# Patient Record
Sex: MALE | Race: WHITE | NOT HISPANIC OR LATINO | URBAN - METROPOLITAN AREA
[De-identification: names, ages, dates, MRNs, and addresses within clinical notes are randomized per-mention and may not be internally consistent; named-entity substitution may affect disease eponyms.]

---

## 2017-03-20 ENCOUNTER — EMERGENCY (EMERGENCY)
Facility: HOSPITAL | Age: 52
LOS: 1 days | Discharge: HOME | End: 2017-03-20
Attending: EMERGENCY MEDICINE
Payer: SELF-PAY

## 2017-03-20 VITALS
RESPIRATION RATE: 16 BRPM | TEMPERATURE: 98 F | DIASTOLIC BLOOD PRESSURE: 71 MMHG | OXYGEN SATURATION: 100 % | SYSTOLIC BLOOD PRESSURE: 127 MMHG | HEART RATE: 87 BPM

## 2017-03-20 VITALS — WEIGHT: 198.42 LBS | HEIGHT: 66 IN

## 2017-03-20 DIAGNOSIS — R20.2 PARESTHESIA OF SKIN: ICD-10-CM

## 2017-03-20 DIAGNOSIS — R11.0 NAUSEA: ICD-10-CM

## 2017-03-20 DIAGNOSIS — R07.89 OTHER CHEST PAIN: ICD-10-CM

## 2017-03-20 DIAGNOSIS — I10 ESSENTIAL (PRIMARY) HYPERTENSION: ICD-10-CM

## 2017-03-20 LAB
ALBUMIN SERPL ELPH-MCNC: 3.8 G/DL — SIGNIFICANT CHANGE UP (ref 3.5–5)
ALP SERPL-CCNC: 80 U/L — SIGNIFICANT CHANGE UP (ref 40–120)
ALT FLD-CCNC: 30 U/L DA — SIGNIFICANT CHANGE UP (ref 10–60)
ANION GAP SERPL CALC-SCNC: 8 MMOL/L — SIGNIFICANT CHANGE UP (ref 5–17)
AST SERPL-CCNC: 25 U/L — SIGNIFICANT CHANGE UP (ref 10–40)
BASOPHILS # BLD AUTO: 0.1 K/UL — SIGNIFICANT CHANGE UP (ref 0–0.2)
BASOPHILS NFR BLD AUTO: 1.6 % — SIGNIFICANT CHANGE UP (ref 0–2)
BILIRUB SERPL-MCNC: 0.6 MG/DL — SIGNIFICANT CHANGE UP (ref 0.2–1.2)
BUN SERPL-MCNC: 17 MG/DL — SIGNIFICANT CHANGE UP (ref 7–18)
CALCIUM SERPL-MCNC: 8.6 MG/DL — SIGNIFICANT CHANGE UP (ref 8.4–10.5)
CHLORIDE SERPL-SCNC: 107 MMOL/L — SIGNIFICANT CHANGE UP (ref 96–108)
CK MB BLD-MCNC: 1.2 % — SIGNIFICANT CHANGE UP (ref 0–3.5)
CK MB CFR SERPL CALC: 2.6 NG/ML — SIGNIFICANT CHANGE UP (ref 0–3.6)
CK SERPL-CCNC: 208 U/L — SIGNIFICANT CHANGE UP (ref 35–232)
CO2 SERPL-SCNC: 25 MMOL/L — SIGNIFICANT CHANGE UP (ref 22–31)
CREAT SERPL-MCNC: 0.92 MG/DL — SIGNIFICANT CHANGE UP (ref 0.5–1.3)
D DIMER BLD IA.RAPID-MCNC: <150 NG/ML DDU — SIGNIFICANT CHANGE UP
EOSINOPHIL # BLD AUTO: 0 K/UL — SIGNIFICANT CHANGE UP (ref 0–0.5)
EOSINOPHIL NFR BLD AUTO: 0.5 % — SIGNIFICANT CHANGE UP (ref 0–6)
GLUCOSE SERPL-MCNC: 109 MG/DL — HIGH (ref 70–99)
HCT VFR BLD CALC: 39.7 % — SIGNIFICANT CHANGE UP (ref 39–50)
HGB BLD-MCNC: 13.4 G/DL — SIGNIFICANT CHANGE UP (ref 13–17)
LIDOCAIN IGE QN: 109 U/L — SIGNIFICANT CHANGE UP (ref 73–393)
LYMPHOCYTES # BLD AUTO: 1.3 K/UL — SIGNIFICANT CHANGE UP (ref 1–3.3)
LYMPHOCYTES # BLD AUTO: 23.4 % — SIGNIFICANT CHANGE UP (ref 13–44)
MCHC RBC-ENTMCNC: 31.6 PG — SIGNIFICANT CHANGE UP (ref 27–34)
MCHC RBC-ENTMCNC: 33.8 GM/DL — SIGNIFICANT CHANGE UP (ref 32–36)
MCV RBC AUTO: 93.7 FL — SIGNIFICANT CHANGE UP (ref 80–100)
MONOCYTES # BLD AUTO: 0.6 K/UL — SIGNIFICANT CHANGE UP (ref 0–0.9)
MONOCYTES NFR BLD AUTO: 9.9 % — SIGNIFICANT CHANGE UP (ref 2–14)
NEUTROPHILS # BLD AUTO: 3.7 K/UL — SIGNIFICANT CHANGE UP (ref 1.8–7.4)
NEUTROPHILS NFR BLD AUTO: 64.5 % — SIGNIFICANT CHANGE UP (ref 43–77)
NT-PROBNP SERPL-SCNC: 48 PG/ML — SIGNIFICANT CHANGE UP (ref 0–125)
PLATELET # BLD AUTO: 220 K/UL — SIGNIFICANT CHANGE UP (ref 150–400)
POTASSIUM SERPL-MCNC: 3.7 MMOL/L — SIGNIFICANT CHANGE UP (ref 3.5–5.3)
POTASSIUM SERPL-SCNC: 3.7 MMOL/L — SIGNIFICANT CHANGE UP (ref 3.5–5.3)
PROT SERPL-MCNC: 7.9 G/DL — SIGNIFICANT CHANGE UP (ref 6–8.3)
RBC # BLD: 4.23 M/UL — SIGNIFICANT CHANGE UP (ref 4.2–5.8)
RBC # FLD: 11.8 % — SIGNIFICANT CHANGE UP (ref 10.3–14.5)
SODIUM SERPL-SCNC: 140 MMOL/L — SIGNIFICANT CHANGE UP (ref 135–145)
TROPONIN I SERPL-MCNC: <0.015 NG/ML — SIGNIFICANT CHANGE UP (ref 0–0.04)
WBC # BLD: 5.7 K/UL — SIGNIFICANT CHANGE UP (ref 3.8–10.5)
WBC # FLD AUTO: 5.7 K/UL — SIGNIFICANT CHANGE UP (ref 3.8–10.5)

## 2017-03-20 PROCEDURE — 71020: CPT | Mod: 26

## 2017-03-20 PROCEDURE — 93005 ELECTROCARDIOGRAM TRACING: CPT

## 2017-03-20 PROCEDURE — 36000 PLACE NEEDLE IN VEIN: CPT

## 2017-03-20 PROCEDURE — 99285 EMERGENCY DEPT VISIT HI MDM: CPT

## 2017-03-20 PROCEDURE — 82553 CREATINE MB FRACTION: CPT

## 2017-03-20 PROCEDURE — 83880 ASSAY OF NATRIURETIC PEPTIDE: CPT

## 2017-03-20 PROCEDURE — 84484 ASSAY OF TROPONIN QUANT: CPT

## 2017-03-20 PROCEDURE — 85027 COMPLETE CBC AUTOMATED: CPT

## 2017-03-20 PROCEDURE — 82550 ASSAY OF CK (CPK): CPT

## 2017-03-20 PROCEDURE — 80053 COMPREHEN METABOLIC PANEL: CPT

## 2017-03-20 PROCEDURE — 85379 FIBRIN DEGRADATION QUANT: CPT

## 2017-03-20 PROCEDURE — 99283 EMERGENCY DEPT VISIT LOW MDM: CPT

## 2017-03-20 PROCEDURE — 83690 ASSAY OF LIPASE: CPT

## 2017-03-20 PROCEDURE — 71046 X-RAY EXAM CHEST 2 VIEWS: CPT

## 2017-03-20 RX ORDER — SODIUM CHLORIDE 9 MG/ML
3 INJECTION INTRAMUSCULAR; INTRAVENOUS; SUBCUTANEOUS ONCE
Qty: 0 | Refills: 0 | Status: COMPLETED | OUTPATIENT
Start: 2017-03-20 | End: 2017-03-20

## 2017-03-20 RX ADMIN — SODIUM CHLORIDE 3 MILLILITER(S): 9 INJECTION INTRAMUSCULAR; INTRAVENOUS; SUBCUTANEOUS at 18:14

## 2017-03-20 NOTE — ED PROVIDER NOTE - NS ED MD SCRIBE ATTENDING SCRIBE SECTIONS
PHYSICAL EXAM/HIV/HISTORY OF PRESENT ILLNESS/PAST MEDICAL/SURGICAL/SOCIAL HISTORY/VITAL SIGNS( Pullset)/DISPOSITION/REVIEW OF SYSTEMS

## 2017-03-20 NOTE — ED ADULT NURSE NOTE - OBJECTIVE STATEMENT
pt a&ox3, here with c/o CP. pt states mild non-radiating CP x5 days. denies n/v, cough, sweats, dizziness, SOB, back pain.

## 2017-03-20 NOTE — ED PROVIDER NOTE - MEDICAL DECISION MAKING DETAILS
50 y/o M pt presents with CP an nausea. Will send card panel, likely 1 set and reassess. 50 y/o M pt presents with CP an nausea. Will send card panel, likely 1 set and reassess. HEART score 1-2

## 2017-03-20 NOTE — ED ADULT NURSE NOTE - CHPI ED SYMPTOMS NEG
no nausea/no shortness of breath/no back pain/no cough/no fever/no vomiting/no chills/no dizziness/no syncope/no diaphoresis

## 2017-03-20 NOTE — ED PROVIDER NOTE - OBJECTIVE STATEMENT
50 y/o M pt with PMHx of HTN presents to the ED c/o chest pressure x 2 days. Pt also notes nausea and tingling to L 4th + 5th fingers. Pt denies fever, cough, SOB, ear pain, throat pain or any other complaints at this time. NKDA

## 2018-06-23 ENCOUNTER — INPATIENT (INPATIENT)
Facility: HOSPITAL | Age: 53
LOS: 2 days | Discharge: HOME | DRG: 370 | End: 2018-06-26
Attending: STUDENT IN AN ORGANIZED HEALTH CARE EDUCATION/TRAINING PROGRAM | Admitting: STUDENT IN AN ORGANIZED HEALTH CARE EDUCATION/TRAINING PROGRAM
Payer: MEDICAID

## 2018-06-23 VITALS
HEART RATE: 103 BPM | SYSTOLIC BLOOD PRESSURE: 156 MMHG | OXYGEN SATURATION: 98 % | TEMPERATURE: 98 F | RESPIRATION RATE: 18 BRPM | DIASTOLIC BLOOD PRESSURE: 103 MMHG

## 2018-06-23 DIAGNOSIS — I10 ESSENTIAL (PRIMARY) HYPERTENSION: ICD-10-CM

## 2018-06-23 DIAGNOSIS — K92.2 GASTROINTESTINAL HEMORRHAGE, UNSPECIFIED: ICD-10-CM

## 2018-06-23 DIAGNOSIS — Z29.9 ENCOUNTER FOR PROPHYLACTIC MEASURES, UNSPECIFIED: ICD-10-CM

## 2018-06-23 LAB
ALBUMIN SERPL ELPH-MCNC: 3.8 G/DL — SIGNIFICANT CHANGE UP (ref 3.5–5)
ALP SERPL-CCNC: 59 U/L — SIGNIFICANT CHANGE UP (ref 40–120)
ALT FLD-CCNC: 31 U/L DA — SIGNIFICANT CHANGE UP (ref 10–60)
ANION GAP SERPL CALC-SCNC: 9 MMOL/L — SIGNIFICANT CHANGE UP (ref 5–17)
APPEARANCE UR: CLEAR — SIGNIFICANT CHANGE UP
APTT BLD: 25.2 SEC — LOW (ref 27.5–37.4)
AST SERPL-CCNC: 20 U/L — SIGNIFICANT CHANGE UP (ref 10–40)
BASOPHILS # BLD AUTO: 0.1 K/UL — SIGNIFICANT CHANGE UP (ref 0–0.2)
BASOPHILS NFR BLD AUTO: 0.6 % — SIGNIFICANT CHANGE UP (ref 0–2)
BILIRUB SERPL-MCNC: 0.8 MG/DL — SIGNIFICANT CHANGE UP (ref 0.2–1.2)
BILIRUB UR-MCNC: NEGATIVE — SIGNIFICANT CHANGE UP
BUN SERPL-MCNC: 35 MG/DL — HIGH (ref 7–18)
CALCIUM SERPL-MCNC: 8.8 MG/DL — SIGNIFICANT CHANGE UP (ref 8.4–10.5)
CHLORIDE SERPL-SCNC: 108 MMOL/L — SIGNIFICANT CHANGE UP (ref 96–108)
CO2 SERPL-SCNC: 24 MMOL/L — SIGNIFICANT CHANGE UP (ref 22–31)
COLOR SPEC: YELLOW — SIGNIFICANT CHANGE UP
CREAT SERPL-MCNC: 0.87 MG/DL — SIGNIFICANT CHANGE UP (ref 0.5–1.3)
DIFF PNL FLD: NEGATIVE — SIGNIFICANT CHANGE UP
EOSINOPHIL # BLD AUTO: 0 K/UL — SIGNIFICANT CHANGE UP (ref 0–0.5)
EOSINOPHIL NFR BLD AUTO: 0 % — SIGNIFICANT CHANGE UP (ref 0–6)
GLUCOSE SERPL-MCNC: 115 MG/DL — HIGH (ref 70–99)
GLUCOSE UR QL: NEGATIVE — SIGNIFICANT CHANGE UP
HCT VFR BLD CALC: 41.2 % — SIGNIFICANT CHANGE UP (ref 39–50)
HGB BLD-MCNC: 13.6 G/DL — SIGNIFICANT CHANGE UP (ref 13–17)
INR BLD: 1 RATIO — SIGNIFICANT CHANGE UP (ref 0.88–1.16)
KETONES UR-MCNC: ABNORMAL
LEUKOCYTE ESTERASE UR-ACNC: NEGATIVE — SIGNIFICANT CHANGE UP
LIDOCAIN IGE QN: 67 U/L — LOW (ref 73–393)
LYMPHOCYTES # BLD AUTO: 1.4 K/UL — SIGNIFICANT CHANGE UP (ref 1–3.3)
LYMPHOCYTES # BLD AUTO: 9.6 % — LOW (ref 13–44)
MCHC RBC-ENTMCNC: 31.2 PG — SIGNIFICANT CHANGE UP (ref 27–34)
MCHC RBC-ENTMCNC: 33 GM/DL — SIGNIFICANT CHANGE UP (ref 32–36)
MCV RBC AUTO: 94.5 FL — SIGNIFICANT CHANGE UP (ref 80–100)
MONOCYTES # BLD AUTO: 0.7 K/UL — SIGNIFICANT CHANGE UP (ref 0–0.9)
MONOCYTES NFR BLD AUTO: 5.1 % — SIGNIFICANT CHANGE UP (ref 2–14)
NEUTROPHILS # BLD AUTO: 12.4 K/UL — HIGH (ref 1.8–7.4)
NEUTROPHILS NFR BLD AUTO: 84.7 % — HIGH (ref 43–77)
NITRITE UR-MCNC: NEGATIVE — SIGNIFICANT CHANGE UP
OB PNL STL: POSITIVE
PH UR: 6 — SIGNIFICANT CHANGE UP (ref 5–8)
PLATELET # BLD AUTO: 236 K/UL — SIGNIFICANT CHANGE UP (ref 150–400)
POTASSIUM SERPL-MCNC: 4.9 MMOL/L — SIGNIFICANT CHANGE UP (ref 3.5–5.3)
POTASSIUM SERPL-SCNC: 4.9 MMOL/L — SIGNIFICANT CHANGE UP (ref 3.5–5.3)
PROT SERPL-MCNC: 8.1 G/DL — SIGNIFICANT CHANGE UP (ref 6–8.3)
PROT UR-MCNC: NEGATIVE — SIGNIFICANT CHANGE UP
PROTHROM AB SERPL-ACNC: 10.9 SEC — SIGNIFICANT CHANGE UP (ref 9.8–12.7)
RBC # BLD: 4.36 M/UL — SIGNIFICANT CHANGE UP (ref 4.2–5.8)
RBC # FLD: 12.9 % — SIGNIFICANT CHANGE UP (ref 10.3–14.5)
SODIUM SERPL-SCNC: 141 MMOL/L — SIGNIFICANT CHANGE UP (ref 135–145)
SP GR SPEC: 1.01 — SIGNIFICANT CHANGE UP (ref 1.01–1.02)
UROBILINOGEN FLD QL: NEGATIVE — SIGNIFICANT CHANGE UP
WBC # BLD: 14.7 K/UL — HIGH (ref 3.8–10.5)
WBC # FLD AUTO: 14.7 K/UL — HIGH (ref 3.8–10.5)

## 2018-06-23 PROCEDURE — 93010 ELECTROCARDIOGRAM REPORT: CPT | Mod: 76

## 2018-06-23 PROCEDURE — 99285 EMERGENCY DEPT VISIT HI MDM: CPT

## 2018-06-23 RX ORDER — PANTOPRAZOLE SODIUM 20 MG/1
8 TABLET, DELAYED RELEASE ORAL
Qty: 80 | Refills: 0 | Status: DISCONTINUED | OUTPATIENT
Start: 2018-06-23 | End: 2018-06-26

## 2018-06-23 RX ORDER — SODIUM CHLORIDE 9 MG/ML
1000 INJECTION INTRAMUSCULAR; INTRAVENOUS; SUBCUTANEOUS ONCE
Qty: 0 | Refills: 0 | Status: COMPLETED | OUTPATIENT
Start: 2018-06-23 | End: 2018-06-23

## 2018-06-23 RX ORDER — PANTOPRAZOLE SODIUM 20 MG/1
40 TABLET, DELAYED RELEASE ORAL ONCE
Qty: 0 | Refills: 0 | Status: COMPLETED | OUTPATIENT
Start: 2018-06-23 | End: 2018-06-23

## 2018-06-23 RX ORDER — SODIUM CHLORIDE 9 MG/ML
1000 INJECTION, SOLUTION INTRAVENOUS
Qty: 0 | Refills: 0 | Status: DISCONTINUED | OUTPATIENT
Start: 2018-06-23 | End: 2018-06-24

## 2018-06-23 RX ORDER — VALSARTAN 80 MG/1
160 TABLET ORAL DAILY
Qty: 0 | Refills: 0 | Status: DISCONTINUED | OUTPATIENT
Start: 2018-06-23 | End: 2018-06-24

## 2018-06-23 RX ORDER — FOLIC ACID 0.8 MG
1 TABLET ORAL DAILY
Qty: 0 | Refills: 0 | Status: DISCONTINUED | OUTPATIENT
Start: 2018-06-23 | End: 2018-06-26

## 2018-06-23 RX ORDER — SODIUM CHLORIDE 9 MG/ML
3 INJECTION INTRAMUSCULAR; INTRAVENOUS; SUBCUTANEOUS ONCE
Qty: 0 | Refills: 0 | Status: COMPLETED | OUTPATIENT
Start: 2018-06-23 | End: 2018-06-23

## 2018-06-23 RX ORDER — THIAMINE MONONITRATE (VIT B1) 100 MG
100 TABLET ORAL DAILY
Qty: 0 | Refills: 0 | Status: DISCONTINUED | OUTPATIENT
Start: 2018-06-23 | End: 2018-06-26

## 2018-06-23 RX ORDER — ONDANSETRON 8 MG/1
4 TABLET, FILM COATED ORAL ONCE
Qty: 0 | Refills: 0 | Status: COMPLETED | OUTPATIENT
Start: 2018-06-23 | End: 2018-06-23

## 2018-06-23 RX ADMIN — PANTOPRAZOLE SODIUM 10 MG/HR: 20 TABLET, DELAYED RELEASE ORAL at 16:57

## 2018-06-23 RX ADMIN — PANTOPRAZOLE SODIUM 40 MILLIGRAM(S): 20 TABLET, DELAYED RELEASE ORAL at 16:44

## 2018-06-23 RX ADMIN — SODIUM CHLORIDE 3 MILLILITER(S): 9 INJECTION INTRAMUSCULAR; INTRAVENOUS; SUBCUTANEOUS at 16:06

## 2018-06-23 RX ADMIN — ONDANSETRON 4 MILLIGRAM(S): 8 TABLET, FILM COATED ORAL at 16:01

## 2018-06-23 RX ADMIN — SODIUM CHLORIDE 2000 MILLILITER(S): 9 INJECTION INTRAMUSCULAR; INTRAVENOUS; SUBCUTANEOUS at 16:01

## 2018-06-23 NOTE — H&P ADULT - HISTORY OF PRESENT ILLNESS
52 y/o M pt with PMHx of HTN presents to ED c/o hematemesis x 2 episodes  and hematochezia. x 2 episodes . Patient is 52 y/o M pt with PMHx of HTN presents to ED c/o hematemesis x 2 episodes  and hematochezia. x 2 episodes . Patient is was in apparently in normal health until yesterday and patient this morning has two episodes of vomited bright red blood x 2 episodes and also noticed bright red blood per rectum  x 2 episodes .Patient denies any  chest pain , shortness of  breath , dizziness , diaphoresis , weight loss or loss of appetite . patient never had colonoscopy or endoscopy in the past . patient visited his primary  care doctor 8  months ago  and patient non complaint with blood pressure medications . patient also reports last  drink was yesterday night .      IN the E.D patient vitals are b.p -156/103 , hr-103 ,rr-18  and spo2-98  and orthostatics positive .  hb/hct -13.6/41.2 and  type and screen A + 52 y/o M pt with PMHx of HTN presents to ED c/o hematemesis x 2 episodes  and melena for last 2 days . Patient is was in apparently in normal health until yesterday and patient this morning has two episodes of vomited bright red blood x 2 episode.Patient denies any  chest pain , shortness of  breath , dizziness , diaphoresis , weight loss or loss of appetite . patient never had colonoscopy or endoscopy in the past . patient visited his primary  care doctor 8  months ago  and patient non complaint with blood pressure medications . patient also reports last  drink was yesterday night .      IN the E.D patient vitals are b.p -156/103 , hr-103 ,rr-18  and spo2-98  and orthostatics positive .  hb/hct -13.6/41.2 and  type and screen A +  EKG : nsr  with heart rate -86 bpm

## 2018-06-23 NOTE — ED PROVIDER NOTE - PHYSICAL EXAMINATION
General: pt lying in stretcher, appears stated age and is not in distress  HEENT: AT/NC, pink conjunctiva, anicteric sclerae, EOMI, PERRLA, TMs smooth, grey, intact, with normal landmarks, nasal mucosa pink, no discharge, turbinates not enlarged; moist mucus membranes, tongue well-papillated, good dentition; posterior pharynx shows no erythema or exudates;   Neck: supple, full ROM, trachea midline, no JVD, no cervical LAD, no midline ttp or stepoffs  Lungs: symmetric excursion, b/l clear vesicular breath sounds with no wheezes, crackles, or rhonchi  Heart: rrr, S1, S2 normal; no S3 or S4; no murmurs or rubs  Abd: normal bowel sounds; soft, nontender; negative McBurney's point tenderness, negative Yang's sign, no rebound, no guarding, spleen non-palpable; no hepatomegaly, no masses  Back: no midline spinal tenderness or stepoffs, no costovertebral angle tenderness  Extremities: no clubbing, cyanosis, or edema; no palpable deformities or fractures  Skin: good turgor; no rashes, petechiae, ecchymoses, or jaundice  Pulses: radial, posterior tibialis (PT), dorsalis pedis (DP) all 2+ & symmetric  Neuro: awake, alert, responsive; oriented to person, place and time; cranial nerves intact, EOMI, intact jaw movement, intact facial sensation, no facial asymmetry, hearing intact; no nystagmus, tongue midline; Motor: Normal tone in upper and lower extremities bilaterally strength 5/5; Sensory: intact to pinprick and light touch; Cerebellar: finger-to-nose intact; normal steady gait; negative Romberg’s sign; DTR: biceps, triceps, patellar, 2+, no pronator drift General: pt lying in stretcher, appears stated age and is not in distress  HEENT: AT/NC, pink conjunctiva, anicteric sclerae, EOMI, PERRLA, nasal mucosa pink, no discharge, turbinates not enlarged; moist mucus membranes, tongue well-papillated, good dentition; posterior pharynx shows no erythema or exudates;   Neck: supple, full ROM, trachea midline, no JVD, no cervical LAD, no midline ttp or stepoffs  Lungs: symmetric excursion, b/l clear vesicular breath sounds with no wheezes, crackles, or rhonchi  Heart: rrr, S1, S2 normal; no S3 or S4; no murmurs or rubs  Abd: normal bowel sounds; soft, nontender; negative McBurney's point tenderness, negative Yang's sign, no rebound, no guarding, spleen non-palpable; no hepatomegaly, no masses  Back: no midline spinal tenderness or stepoffs, no costovertebral angle tenderness  Extremities: no clubbing, cyanosis, or edema; no palpable deformities or fractures  Skin: good turgor; no rashes, petechiae, ecchymoses, or jaundice  Pulses: radial, posterior tibialis (PT), dorsalis pedis (DP) all 2+ & symmetric  Neuro: awake, alert, responsive; oriented to person, place and time; cranial nerves intact, EOMI, intact jaw movement, intact facial sensation, no facial asymmetry, hearing intact; no nystagmus, tongue midline; Motor: Normal tone in upper and lower extremities bilaterally; Sensory: intact; normal steady gait;   Rectum: good sphincter tone, black stool, no gross blood, no masses or tenderness

## 2018-06-23 NOTE — ED PROVIDER NOTE - OBJECTIVE STATEMENT
52 y/o M pt with PMHx of HTN presents to ED c/o hematemesis and hematochezia. Pt reports that he was drinking alcohol last night and this morning woke up feeling generally weak, unwell, had 2 episodes of bright red blood with vomiting, 1 episode of black stool, and diaphoresis. Pt denies abd pain, chest pain, shortness of breath, dizziness, weakness, fever, chills, night sweats, or any other complaints. Pt denies h/o similar symptoms in the past. Fhx: noncontributory. Social Hx: alcohol use.

## 2018-06-23 NOTE — H&P ADULT - NSHPLABSRESULTS_GEN_ALL_CORE
Vital Signs Last 24 Hrs  T(C): 36.9 (2018 22:01), Max: 37.1 (2018 20:13)  T(F): 98.4 (2018 22:01), Max: 98.7 (2018 20:13)  HR: 90 (2018 20:13) (90 - 103)  BP: 172/87 (2018 20:13) (156/103 - 172/87)  BP(mean): --  RR: 18 (2018 20:13) (18 - 18)  SpO2: 100% (2018 20:13) (98% - 100%)      LABS:                        13.6   14.7  )-----------( 236      ( 2018 16:08 )             41.2     06-23    141  |  108  |  35<H>  ----------------------------<  115<H>  4.9   |  24  |  0.87    Ca    8.8      2018 16:08    TPro  8.1  /  Alb  3.8  /  TBili  0.8  /  DBili  x   /  AST  20  /  ALT  31  /  AlkPhos  59  06-23    PT/INR - ( 2018 16:08 )   PT: 10.9 sec;   INR: 1.00 ratio         PTT - ( 2018 16:08 )  PTT:25.2 sec  Urinalysis Basic - ( 2018 16:48 )    Color: Yellow / Appearance: Clear / S.015 / pH: x  Gluc: x / Ketone: Moderate  / Bili: Negative / Urobili: Negative   Blood: x / Protein: Negative / Nitrite: Negative   Leuk Esterase: Negative / RBC: x / WBC x   Sq Epi: x / Non Sq Epi: x / Bacteria: x

## 2018-06-23 NOTE — ED PROVIDER NOTE - CHPI ED SYMPTOMS NEG
no chills/no abd pain, no chest pain, no shortness of breath, no dizziness, no night sweats/no fever

## 2018-06-23 NOTE — H&P ADULT - ASSESSMENT
54 y/o M pt with PMHx of HTN presents to ED c/o hematemesis x 2 episodes  and hematochezia. x 2 episodes 54 y/o M pt with PMHx of HTN presents to ED c/o hematemesis x 2 episodes  and hematochezia. x 2 episodes     will admit the patient for   upper gi bleed   htn  need for prophylactic measure

## 2018-06-23 NOTE — H&P ADULT - PROBLEM SELECTOR PLAN 1
- likely secondary  to esophageal varices / erosive gastritis /peptic ulcer disease /harini trimble   -monitor cbc q8 hours   cbc stable 13.6-12.8 -12.6   -GI  :  informed   -patient to get endoscopy on monday as per DR Margoth AARON  - will start the patient on clear liquid diet and will keep npo after mid night

## 2018-06-23 NOTE — H&P ADULT - FAMILY HISTORY
Mother  Still living? Unknown  Family history of ischemic heart disease (IHD), Age at diagnosis: Age Unknown

## 2018-06-23 NOTE — H&P ADULT - NSHPPHYSICALEXAM_GEN_ALL_CORE
PHYSICAL EXAM:  GENERAL: NAD, well-groomed, well-developed  HEAD:  Atraumatic, Normocephalic  EYES: EOMI, PERRLA, conjunctiva and sclera clear  NECK: Supple, No JVD, Normal thyroid  CHEST/LUNG: Clear to percussion bilaterally; No rales, rhonchi, wheezing, or rubs  HEART: Regular rate and rhythm; No murmurs, rubs, or gallops  ABDOMEN: Soft, Nontender, Nondistended; Bowel sounds present  NERVOUS SYSTEM:  Alert & Oriented X3, Good concentration; Motor Strength 5/5 B/L   EXTREMITIES:  2+ Peripheral Pulses, No clubbing, cyanosis, or edema  SKIN; PHYSICAL EXAM:  GENERAL: NAD, well-groomed, well-developed  HEAD:  Atraumatic, Normocephalic  EYES: EOMI, PERRLA, conjunctiva and sclera clear  NECK: Supple, No JVD, Normal thyroid  CHEST/LUNG: Clear to percussion bilaterally; No rales, rhonchi, wheezing, or rubs  HEART: Regular rate and rhythm; No murmurs, rubs, or gallops  ABDOMEN: Soft, Nontender, Nondistended; Bowel sounds present  NERVOUS SYSTEM:  Alert & Oriented X3, Good concentration; Motor Strength 5/5 B/L   EXTREMITIES:  2+ Peripheral Pulses, No clubbing, cyanosis, or edema  SKIN; intact

## 2018-06-23 NOTE — H&P ADULT - PROBLEM SELECTOR PLAN 3
IMPROVE VTE Individual Risk Assessment          RISK                                                          Points  [  ] Previous VTE                                                3  [  ] Thrombophilia                                             2  [  ] Lower limb paralysis                                   2        (unable to hold up >15 seconds)    [  ] Current Cancer                                             2         (within 6 months)  [  ] Immobilization > 24 hrs                              1  [  ] ICU/CCU stay > 24 hours                             1  [  ] Age > 60                                                         1    IMPROVE VTE Score: 0 , no need for chemical dvt prophylaxsis

## 2018-06-23 NOTE — H&P ADULT - ATTENDING COMMENTS
Agree with all the above, except   Patient seen and examined at bedside. HE is a 52 yo male with no significant PMH presented with two episode for bloody vomiting and dark stool.   Reports taking Advil regularly for headache and drink alcohol (vodka) almost every day. On admission had some lightheadedness which resolved since.     ROS and PE as above     Vital Signs Last 24 Hrs  T(C): 37 (24 Jun 2018 07:24), Max: 37.1 (23 Jun 2018 20:13)  T(F): 98.6 (24 Jun 2018 07:24), Max: 98.7 (23 Jun 2018 20:13)  HR: 77 (24 Jun 2018 07:24) (73 - 103)  BP: 160/85 (24 Jun 2018 07:24) (156/103 - 172/87)  BP(mean): --  RR: 19 (24 Jun 2018 07:24) (17 - 19)  SpO2: 98% (24 Jun 2018 07:24) (98% - 100%)    1. GI bleed likely upper GI   hemoglobin on admission 13 dropped to 12   Monitor CBC q8   Plan for EGD in AM   C/w Pantoprazole IV   NPO after midnight, can c/w with clear liquid diet for now     2. HTN non compliant with meds   On admission   Valsartan 160 mg PO daily started by covering resident    Plan of care discussed with patient in detail. Agree with all the above, except   Patient seen and examined at bedside. HE is a 52 yo male with no significant PMH presented with two episode for bloody vomiting and dark stool.   Reports taking Advil regularly for headache and drink alcohol (vodka) almost every day. On admission had some lightheadedness which resolved since.     ROS and PE as above     Vital Signs Last 24 Hrs  T(C): 37 (24 Jun 2018 07:24), Max: 37.1 (23 Jun 2018 20:13)  T(F): 98.6 (24 Jun 2018 07:24), Max: 98.7 (23 Jun 2018 20:13)  HR: 77 (24 Jun 2018 07:24) (73 - 103)  BP: 160/85 (24 Jun 2018 07:24) (156/103 - 172/87)  BP(mean): --  RR: 19 (24 Jun 2018 07:24) (17 - 19)  SpO2: 98% (24 Jun 2018 07:24) (98% - 100%)    1. GI bleed likely upper GI   hemoglobin on admission 13 dropped to 12   Orthostatic BP was positive on admission, improved s/p IV fluids     Monitor CBC q8   Plan for EGD in AM   C/w Pantoprazole IV   IV fluids  NPO after midnight, can c/w with clear liquid diet for now     2. HTN non compliant with meds   On admission   Valsartan 160 mg PO daily started by covering resident    Plan of care discussed with patient in detail.

## 2018-06-23 NOTE — ED ADULT NURSE NOTE - ED STAT RN HANDOFF DETAILS 2
handover report given to Nurse Olson. Remains being nursed on Tele Box -Sinus Rhythm noted. IV Protonix infusion in progress @ prescribed rate.

## 2018-06-23 NOTE — ED PROVIDER NOTE - MEDICAL DECISION MAKING DETAILS
54 yo m w/ aforementioned presentation concerning for upper GIB found to be guaiac positive on exam, treated w/ PPI drip and bolus and IVF. Pt admitted to medicine.

## 2018-06-24 LAB
ANION GAP SERPL CALC-SCNC: 6 MMOL/L — SIGNIFICANT CHANGE UP (ref 5–17)
BASOPHILS # BLD AUTO: 0.1 K/UL — SIGNIFICANT CHANGE UP (ref 0–0.2)
BASOPHILS NFR BLD AUTO: 0.9 % — SIGNIFICANT CHANGE UP (ref 0–2)
BUN SERPL-MCNC: 20 MG/DL — HIGH (ref 7–18)
CALCIUM SERPL-MCNC: 8.6 MG/DL — SIGNIFICANT CHANGE UP (ref 8.4–10.5)
CHLORIDE SERPL-SCNC: 108 MMOL/L — SIGNIFICANT CHANGE UP (ref 96–108)
CHOLEST SERPL-MCNC: 208 MG/DL — HIGH (ref 10–199)
CO2 SERPL-SCNC: 28 MMOL/L — SIGNIFICANT CHANGE UP (ref 22–31)
CREAT SERPL-MCNC: 0.84 MG/DL — SIGNIFICANT CHANGE UP (ref 0.5–1.3)
EOSINOPHIL # BLD AUTO: 0.1 K/UL — SIGNIFICANT CHANGE UP (ref 0–0.5)
EOSINOPHIL NFR BLD AUTO: 1.3 % — SIGNIFICANT CHANGE UP (ref 0–6)
FOLATE SERPL-MCNC: >20 NG/ML — SIGNIFICANT CHANGE UP
GGT SERPL-CCNC: 38 U/L — SIGNIFICANT CHANGE UP (ref 9–50)
GLUCOSE SERPL-MCNC: 91 MG/DL — SIGNIFICANT CHANGE UP (ref 70–99)
HBA1C BLD-MCNC: 5.4 % — SIGNIFICANT CHANGE UP (ref 4–5.6)
HCT VFR BLD CALC: 38.4 % — LOW (ref 39–50)
HCT VFR BLD CALC: 38.6 % — LOW (ref 39–50)
HCT VFR BLD CALC: 39 % — SIGNIFICANT CHANGE UP (ref 39–50)
HCT VFR BLD CALC: 42.6 % — SIGNIFICANT CHANGE UP (ref 39–50)
HDLC SERPL-MCNC: 40 MG/DL — SIGNIFICANT CHANGE UP (ref 40–125)
HGB BLD-MCNC: 12.5 G/DL — LOW (ref 13–17)
HGB BLD-MCNC: 12.6 G/DL — LOW (ref 13–17)
HGB BLD-MCNC: 12.8 G/DL — LOW (ref 13–17)
HGB BLD-MCNC: 14 G/DL — SIGNIFICANT CHANGE UP (ref 13–17)
INR BLD: 1 RATIO — SIGNIFICANT CHANGE UP (ref 0.88–1.16)
LIPID PNL WITH DIRECT LDL SERPL: 110 MG/DL — SIGNIFICANT CHANGE UP
LYMPHOCYTES # BLD AUTO: 2.3 K/UL — SIGNIFICANT CHANGE UP (ref 1–3.3)
LYMPHOCYTES # BLD AUTO: 30 % — SIGNIFICANT CHANGE UP (ref 13–44)
MCHC RBC-ENTMCNC: 30.7 PG — SIGNIFICANT CHANGE UP (ref 27–34)
MCHC RBC-ENTMCNC: 30.7 PG — SIGNIFICANT CHANGE UP (ref 27–34)
MCHC RBC-ENTMCNC: 30.8 PG — SIGNIFICANT CHANGE UP (ref 27–34)
MCHC RBC-ENTMCNC: 31 PG — SIGNIFICANT CHANGE UP (ref 27–34)
MCHC RBC-ENTMCNC: 32.3 GM/DL — SIGNIFICANT CHANGE UP (ref 32–36)
MCHC RBC-ENTMCNC: 32.8 GM/DL — SIGNIFICANT CHANGE UP (ref 32–36)
MCV RBC AUTO: 93.6 FL — SIGNIFICANT CHANGE UP (ref 80–100)
MCV RBC AUTO: 93.7 FL — SIGNIFICANT CHANGE UP (ref 80–100)
MCV RBC AUTO: 94.2 FL — SIGNIFICANT CHANGE UP (ref 80–100)
MCV RBC AUTO: 94.9 FL — SIGNIFICANT CHANGE UP (ref 80–100)
MONOCYTES # BLD AUTO: 0.9 K/UL — SIGNIFICANT CHANGE UP (ref 0–0.9)
MONOCYTES NFR BLD AUTO: 11.5 % — SIGNIFICANT CHANGE UP (ref 2–14)
NEUTROPHILS # BLD AUTO: 4.3 K/UL — SIGNIFICANT CHANGE UP (ref 1.8–7.4)
NEUTROPHILS NFR BLD AUTO: 56.4 % — SIGNIFICANT CHANGE UP (ref 43–77)
PLATELET # BLD AUTO: 168 K/UL — SIGNIFICANT CHANGE UP (ref 150–400)
PLATELET # BLD AUTO: 198 K/UL — SIGNIFICANT CHANGE UP (ref 150–400)
PLATELET # BLD AUTO: 215 K/UL — SIGNIFICANT CHANGE UP (ref 150–400)
PLATELET # BLD AUTO: 220 K/UL — SIGNIFICANT CHANGE UP (ref 150–400)
POTASSIUM SERPL-MCNC: 3.5 MMOL/L — SIGNIFICANT CHANGE UP (ref 3.5–5.3)
POTASSIUM SERPL-SCNC: 3.5 MMOL/L — SIGNIFICANT CHANGE UP (ref 3.5–5.3)
PROTHROM AB SERPL-ACNC: 10.9 SEC — SIGNIFICANT CHANGE UP (ref 9.8–12.7)
RBC # BLD: 4.07 M/UL — LOW (ref 4.2–5.8)
RBC # BLD: 4.1 M/UL — LOW (ref 4.2–5.8)
RBC # BLD: 4.16 M/UL — LOW (ref 4.2–5.8)
RBC # BLD: 4.52 M/UL — SIGNIFICANT CHANGE UP (ref 4.2–5.8)
RBC # FLD: 12.3 % — SIGNIFICANT CHANGE UP (ref 10.3–14.5)
RBC # FLD: 12.4 % — SIGNIFICANT CHANGE UP (ref 10.3–14.5)
RBC # FLD: 12.8 % — SIGNIFICANT CHANGE UP (ref 10.3–14.5)
RBC # FLD: 12.9 % — SIGNIFICANT CHANGE UP (ref 10.3–14.5)
SODIUM SERPL-SCNC: 142 MMOL/L — SIGNIFICANT CHANGE UP (ref 135–145)
TOTAL CHOLESTEROL/HDL RATIO MEASUREMENT: 5.2 RATIO — SIGNIFICANT CHANGE UP (ref 3.4–9.6)
TRIGL SERPL-MCNC: 289 MG/DL — HIGH (ref 10–149)
TSH SERPL-MCNC: 2.29 UU/ML — SIGNIFICANT CHANGE UP (ref 0.34–4.82)
VIT B12 SERPL-MCNC: 329 PG/ML — SIGNIFICANT CHANGE UP (ref 232–1245)
WBC # BLD: 6.9 K/UL — SIGNIFICANT CHANGE UP (ref 3.8–10.5)
WBC # BLD: 7.4 K/UL — SIGNIFICANT CHANGE UP (ref 3.8–10.5)
WBC # BLD: 7.6 K/UL — SIGNIFICANT CHANGE UP (ref 3.8–10.5)
WBC # BLD: 9.3 K/UL — SIGNIFICANT CHANGE UP (ref 3.8–10.5)
WBC # FLD AUTO: 6.9 K/UL — SIGNIFICANT CHANGE UP (ref 3.8–10.5)
WBC # FLD AUTO: 7.4 K/UL — SIGNIFICANT CHANGE UP (ref 3.8–10.5)
WBC # FLD AUTO: 7.6 K/UL — SIGNIFICANT CHANGE UP (ref 3.8–10.5)
WBC # FLD AUTO: 9.3 K/UL — SIGNIFICANT CHANGE UP (ref 3.8–10.5)

## 2018-06-24 PROCEDURE — 99233 SBSQ HOSP IP/OBS HIGH 50: CPT | Mod: GC

## 2018-06-24 PROCEDURE — 74176 CT ABD & PELVIS W/O CONTRAST: CPT | Mod: 26

## 2018-06-24 RX ORDER — VALSARTAN 80 MG/1
160 TABLET ORAL DAILY
Qty: 0 | Refills: 0 | Status: DISCONTINUED | OUTPATIENT
Start: 2018-06-24 | End: 2018-06-25

## 2018-06-24 RX ADMIN — VALSARTAN 160 MILLIGRAM(S): 80 TABLET ORAL at 03:57

## 2018-06-24 RX ADMIN — Medication 100 MILLIGRAM(S): at 03:59

## 2018-06-24 RX ADMIN — PANTOPRAZOLE SODIUM 10 MG/HR: 20 TABLET, DELAYED RELEASE ORAL at 04:00

## 2018-06-24 RX ADMIN — Medication 1 MILLIGRAM(S): at 11:57

## 2018-06-24 RX ADMIN — PANTOPRAZOLE SODIUM 10 MG/HR: 20 TABLET, DELAYED RELEASE ORAL at 09:41

## 2018-06-24 RX ADMIN — Medication 100 MILLIGRAM(S): at 11:57

## 2018-06-24 RX ADMIN — VALSARTAN 160 MILLIGRAM(S): 80 TABLET ORAL at 21:58

## 2018-06-24 NOTE — ED ADULT NURSE REASSESSMENT NOTE - NS ED NURSE REASSESS COMMENT FT1
Patient was received from LISA Meeks. Patient is hemodynamically stable and in no acute distress. No bloody emesis episode noted while in ER. Patient verbalizes no medical complaints.

## 2018-06-25 LAB
ANION GAP SERPL CALC-SCNC: 6 MMOL/L — SIGNIFICANT CHANGE UP (ref 5–17)
BUN SERPL-MCNC: 13 MG/DL — SIGNIFICANT CHANGE UP (ref 7–18)
CALCIUM SERPL-MCNC: 8.8 MG/DL — SIGNIFICANT CHANGE UP (ref 8.4–10.5)
CHLORIDE SERPL-SCNC: 106 MMOL/L — SIGNIFICANT CHANGE UP (ref 96–108)
CO2 SERPL-SCNC: 27 MMOL/L — SIGNIFICANT CHANGE UP (ref 22–31)
CREAT SERPL-MCNC: 0.88 MG/DL — SIGNIFICANT CHANGE UP (ref 0.5–1.3)
GLUCOSE SERPL-MCNC: 96 MG/DL — SIGNIFICANT CHANGE UP (ref 70–99)
HCT VFR BLD CALC: 38.2 % — LOW (ref 39–50)
HGB BLD-MCNC: 12.4 G/DL — LOW (ref 13–17)
MAGNESIUM SERPL-MCNC: 2.2 MG/DL — SIGNIFICANT CHANGE UP (ref 1.6–2.6)
MCHC RBC-ENTMCNC: 31 PG — SIGNIFICANT CHANGE UP (ref 27–34)
MCHC RBC-ENTMCNC: 32.5 GM/DL — SIGNIFICANT CHANGE UP (ref 32–36)
MCV RBC AUTO: 95.3 FL — SIGNIFICANT CHANGE UP (ref 80–100)
PHOSPHATE SERPL-MCNC: 3.1 MG/DL — SIGNIFICANT CHANGE UP (ref 2.5–4.5)
PLATELET # BLD AUTO: 223 K/UL — SIGNIFICANT CHANGE UP (ref 150–400)
POTASSIUM SERPL-MCNC: 3.4 MMOL/L — LOW (ref 3.5–5.3)
POTASSIUM SERPL-SCNC: 3.4 MMOL/L — LOW (ref 3.5–5.3)
RBC # BLD: 4.01 M/UL — LOW (ref 4.2–5.8)
RBC # FLD: 12.8 % — SIGNIFICANT CHANGE UP (ref 10.3–14.5)
SODIUM SERPL-SCNC: 139 MMOL/L — SIGNIFICANT CHANGE UP (ref 135–145)
WBC # BLD: 6.1 K/UL — SIGNIFICANT CHANGE UP (ref 3.8–10.5)
WBC # FLD AUTO: 6.1 K/UL — SIGNIFICANT CHANGE UP (ref 3.8–10.5)

## 2018-06-25 PROCEDURE — 99222 1ST HOSP IP/OBS MODERATE 55: CPT | Mod: 25

## 2018-06-25 PROCEDURE — 88305 TISSUE EXAM BY PATHOLOGIST: CPT | Mod: 26

## 2018-06-25 PROCEDURE — 43255 EGD CONTROL BLEEDING ANY: CPT

## 2018-06-25 PROCEDURE — 88312 SPECIAL STAINS GROUP 1: CPT | Mod: 26

## 2018-06-25 PROCEDURE — 99233 SBSQ HOSP IP/OBS HIGH 50: CPT | Mod: GC

## 2018-06-25 RX ORDER — VALSARTAN 80 MG/1
320 TABLET ORAL DAILY
Qty: 0 | Refills: 0 | Status: DISCONTINUED | OUTPATIENT
Start: 2018-06-26 | End: 2018-06-26

## 2018-06-25 RX ORDER — VALSARTAN 80 MG/1
80 TABLET ORAL ONCE
Qty: 0 | Refills: 0 | Status: COMPLETED | OUTPATIENT
Start: 2018-06-25 | End: 2018-06-25

## 2018-06-25 RX ADMIN — VALSARTAN 80 MILLIGRAM(S): 80 TABLET ORAL at 15:19

## 2018-06-25 RX ADMIN — PANTOPRAZOLE SODIUM 10 MG/HR: 20 TABLET, DELAYED RELEASE ORAL at 15:18

## 2018-06-25 RX ADMIN — VALSARTAN 160 MILLIGRAM(S): 80 TABLET ORAL at 05:31

## 2018-06-25 RX ADMIN — PANTOPRAZOLE SODIUM 10 MG/HR: 20 TABLET, DELAYED RELEASE ORAL at 03:44

## 2018-06-25 NOTE — PROGRESS NOTE ADULT - PROBLEM SELECTOR PLAN 1
- Unknown source: likely causes: Esophageal varices / erosive gastritis /peptic ulcer disease /harnii trimble?  - CBC Stable    - GI: Dr. Khan: EGD today

## 2018-06-25 NOTE — DISCHARGE NOTE ADULT - MEDICATION SUMMARY - MEDICATIONS TO TAKE
I will START or STAY ON the medications listed below when I get home from the hospital:    valsartan 320 mg oral tablet  -- 1 tab(s) by mouth once a day  -- Indication: For HTN (hypertension)    pantoprazole 40 mg oral delayed release tablet  -- 1 tab(s) by mouth once a day (before a meal)  -- Indication: For Kristine Vikram Tear    folic acid 1 mg oral tablet  -- 1 tab(s) by mouth once a day  -- Indication: For Vitamin supplements    thiamine 100 mg oral tablet  -- 1 tab(s) by mouth once a day  -- Indication: For Vitamin supplements

## 2018-06-25 NOTE — CONSULT NOTE ADULT - ASSESSMENT
53 year old male ETOH user, on NSAIDs presents with 2 episodes fo hematemesis. Lab  and imaging not consistent with cirrhosis. HD stable     Plan:  NPO   IV PPI drip  Monitor CBC   PRBC to support HD and keep Hemoglobin > 7   EGD today  Further management based on EGD results.

## 2018-06-25 NOTE — PROGRESS NOTE ADULT - ATTENDING COMMENTS
Agree with above   Patient seen and examined at bedside. Has no new complains, no more episodes of vomiting or melena. H/H is stable.   ROS and PE as above     Vital Signs Last 24 Hrs  T(C): 37 (25 Jun 2018 11:08), Max: 37.2 (24 Jun 2018 11:47)  T(F): 98.6 (25 Jun 2018 11:08), Max: 99 (24 Jun 2018 11:47)  HR: 84 (25 Jun 2018 11:08) (72 - 88)  BP: 158/88 (25 Jun 2018 11:08) (136/87 - 161/94)  BP(mean): --  RR: 18 (25 Jun 2018 11:08) (16 - 19)  SpO2: 100% (25 Jun 2018 11:08) (99% - 100%)    1. GI bleed likely upper GI, ulcer can not be excluded, patient on Advil and alcohol   On pantoprazole IV   Spoke to Dr. Lopez, patient scheduled for EGD today     2. HTN: BP not optimally controlled  Will increase Valsartan to 320 mg po daily

## 2018-06-25 NOTE — PROGRESS NOTE ADULT - SUBJECTIVE AND OBJECTIVE BOX
==================PGY 1 Note===================   Discussed with supervising resident and primary attending    ================CHIEF COMPLAINT===============  Patient is a 53y old  Male who presents with a chief complaint of hematemesis (2018 22:50)        =========INTERVAL HPI/OVERNIGHT EVENTS=========  Offers no new complaints      ============CURRENT MEDICATIONS===============    MEDICATIONS  (STANDING):  folic acid 1 milliGRAM(s) Oral daily  pantoprazole Infusion 8 mG/Hr (10 mL/Hr) IV Continuous <Continuous>  thiamine 100 milliGRAM(s) Oral daily  valsartan 160 milliGRAM(s) Oral daily    MEDICATIONS  (PRN):        ============REVIEW OF SYSTEMS==================    CONSTITUTIONAL: No fever  EYES: no acute visual disturbances  NECK: No pain or stiffness  RESPIRATORY: No cough; No shortness of breath  CARDIOVASCULAR: No chest pain, no palpitations  GASTROINTESTINAL: No pain. No nausea or vomiting; No diarrhea   NEUROLOGICAL: No headache or numbness, no tremors  MUSCULOSKELETAL: No joint pain, no muscle pain  GENITOURINARY: no dysuria, no frequency, no hesitancy  PSYCHIATRY: no depression , no anxiety  ALL OTHER  ROS negative      ================VITALS SIGNS=====================  Vital Signs Last 24 Hrs  T(C): 36.7 (2018 07:49), Max: 37.2 (2018 11:47)  T(F): 98.1 (2018 07:49), Max: 99 (2018 11:47)  HR: 80 (2018 07:49) (72 - 88)  BP: 142/83 (2018 07:49) (136/87 - 161/94)  BP(mean): --  RR: 19 (2018 07:49) (16 - 19)  SpO2: 100% (2018 07:49) (99% - 100%)    ===============PHYSICAL EXAM====================    GENERAL: NAD  HEENT: Normocephalic;  conjunctivae and sclerae clear; moist mucous membranes;   NECK : supple  CHEST/LUNG: Clear to auscultation bilaterally with good air entry   HEART: S1 S2  regular; no murmurs, gallops or rubs  ABDOMEN: Soft, Nontender, Nondistended; Bowel sounds present  EXTREMITIES: no cyanosis; no edema; no calf tenderness  SKIN: warm and dry; no rash  NERVOUS SYSTEM:  Awake and alert; Oriented  to place, person and time    ==============LABORATORIES======================  LABS:                        12.4   6.1   )-----------( 223      ( 2018 07:29 )             38.2     06-25    139  |  106  |  13  ----------------------------<  96  3.4<L>   |  27  |  0.88    Ca    8.8      2018 07:29  Phos  3.1     06-25  Mg     2.2     06-25    TPro  8.1  /  Alb  3.8  /  TBili  0.8  /  DBili  x   /  AST  20  /  ALT  31  /  AlkPhos  59  06-23    PT/INR - ( 2018 05:51 )   PT: 10.9 sec;   INR: 1.00 ratio         PTT - ( 2018 16:08 )  PTT:25.2 sec  Urinalysis Basic - ( 2018 16:48 )    Color: Yellow / Appearance: Clear / S.015 / pH: x  Gluc: x / Ketone: Moderate  / Bili: Negative / Urobili: Negative   Blood: x / Protein: Negative / Nitrite: Negative   Leuk Esterase: Negative / RBC: x / WBC x   Sq Epi: x / Non Sq Epi: x / Bacteria: x      CAPILLARY BLOOD GLUCOSE          =============INPUTS/OUPUTS=====================        RADIOLOGY & ADDITIONAL TESTS:    Imaging Personally Reviewed:  YES    Consultant(s) Notes Reviewed:   YES    Care Discussed with Consultants : YES    Plan of care was discussed with patient  and /or primary care giver; all questions and concerns were addressed and care was aligned with patient's wishes. Time was allowed for questions that were answered to the best of my abilities

## 2018-06-25 NOTE — CHART NOTE - NSCHARTNOTEFT_GEN_A_CORE
Reviewed Esophagogastroduodenoscopy Report  Findings significant for Grade A esophagitis, Small ulcer versus linear tear at GE junction with active oozing of blood. Two hemo-clip placed over the ulcer. MW tear versus GE junction ulcer (active oozing of blood) S/P Epinephrine and Hemo-clip placement  As per GI: Liquid diet today and advance to regular diet tomorrow.

## 2018-06-25 NOTE — DISCHARGE NOTE ADULT - CARE PLAN
Principal Discharge DX:	Gastrointestinal bleeding, upper  Secondary Diagnosis:	HTN (hypertension) Principal Discharge DX:	Gastrointestinal bleeding, upper  Secondary Diagnosis:	HTN (hypertension)  Assessment and plan of treatment:	Continue with blood pressure medication. Maintain a healthy diet that consist of low sugar, low fat, low sodium diet. Exercise frequently if possible.  Follow up with primary care physician in one week after discharge. Principal Discharge DX:	Gastrointestinal bleeding, upper  Goal:	Follow up with GI specialist Dr Khan  Assessment and plan of treatment:	Admitted to the floor for Upper GI Bleed. Unknown source initially but EGD was performed: Findings significant for Grade A esophagitis, Small ulcer versus linear tear at GE junction with active oozing of blood. Two hemo-clip placed over the ulcer. MW tear versus GE junction ulcer (active oozing of blood) S/P Epinephrine and Hemo-clip placement. As per GI: Liquid diet today and advance to regular diet which patient tolerated. Will need follow up with Gastroenterologist upon discharge. Information provided upon discharge for Dr Khan.  Secondary Diagnosis:	HTN (hypertension)  Assessment and plan of treatment:	Continue with blood pressure medication. Maintain a healthy diet that consist of low sugar, low fat, low sodium diet. Exercise frequently if possible.  Follow up with primary care physician in one week after discharge. Principal Discharge DX:	Gastrointestinal bleeding, upper  Goal:	Follow up with GI specialist Dr Khan  Assessment and plan of treatment:	Admitted to the floor for Upper GI Bleed. Unknown source initially but EGD was performed: Findings significant for Grade A esophagitis, Small ulcer versus linear tear at GE junction with active oozing of blood. Two hemo-clip placed over the ulcer. MW tear versus GE junction ulcer (active oozing of blood) S/P Epinephrine and Hemo-clip placement. As per GI: Liquid diet today and advance to regular diet which patient tolerated. Will need follow up with Gastroenterologist upon discharge. Information provided upon discharge for Dr Khan.  Secondary Diagnosis:	HTN (hypertension)  Assessment and plan of treatment:	Continue with blood pressure medication dosage will be Diovan 320 mg PO daily Maintain a healthy diet that consist of low sugar, low fat, low sodium diet. Exercise frequently if possible.  Follow up with primary care physician in one week after discharge.

## 2018-06-25 NOTE — PROGRESS NOTE ADULT - ASSESSMENT
54 y/o M pt with PMHx of HTN presents to ED c/o hematemesis x 2 episodes and hematochezia x 2 episodes     Admitted to the floor for Upper GI Bleed

## 2018-06-25 NOTE — DISCHARGE NOTE ADULT - PATIENT PORTAL LINK FT
You can access the [facility] Patient Portal, offered by [facility], by registering with the following website: [email or web address]

## 2018-06-25 NOTE — DISCHARGE NOTE ADULT - HOSPITAL COURSE
Background  and course of admission:      52 y/o M pt with PMHx of HTN presents to ED c/o hematemesis x 2 episodes  and melena for last 2 days . Patient is was in apparently in normal health until yesterday and patient this morning has two episodes of vomited bright red blood x 2 episode. Patient denies any  chest pain , shortness of  breath , dizziness , diaphoresis , weight loss or loss of appetite . patient never had colonoscopy or endoscopy in the past . Patient visited his primary  care doctor 8  months ago  and patient non complaint with blood pressure medications . Patient also reports last drink was yesterday night .    Admitted to the floor for Upper GI Bleed. Unknown source initially but EGD was performed: Findings significant for Grade A esophagitis, Small ulcer versus linear tear at GE junction with active oozing of blood. Two hemo-clip placed over the ulcer. MW tear versus GE junction ulcer (active oozing of blood) S/P Epinephrine and Hemo-clip placement. As per GI: Liquid diet today and advance to regular diet which patient tolerated. Will need follow up with Gastroenterologist upon discharge. Information provided upon discharge for Dr Khan.     Given patient's improved clinical status and current hemodynamic stability, decision was made to discharge.  Please refer to patient's complete medical chart with documents for a full hospital course, for this is only a brief summary.

## 2018-06-25 NOTE — CONSULT NOTE ADULT - SUBJECTIVE AND OBJECTIVE BOX
Patient is a 53y old  Male who presents with a chief complaint of hematemesis (2018 22:50)    53 year old male with no significant medical history , ETOH abuse rpesents with two epsidoes of hematemesis. He also reprots dark tarry stools for over a week. He also reprots using NSAIDs for headache. He denies epigastric pain.     REVIEW OF SYSTEMS  Constitutional:   No fever, no fatigue, no pallor, no night sweats, no weight loss.  HEENT:   No eye pain, no vision changes, no icterus, no mouth ulcers.  Respiratory:   No shortness of breath, no cough, no respiratory distress.   Cardiovascular:   No chest pain, no palpitations.   Gastrointestinal: No abdominal pain, no nausea, + vomiting , no diarrhea no constipation, no hematochezia, + melena.  Skin:   No rashes, no jaundice, no eczema.     ___________________________________________________________________________________________  Allergies    No Known Allergies    Intolerances      MEDICATIONS  (STANDING):  folic acid 1 milliGRAM(s) Oral daily  pantoprazole Infusion 8 mG/Hr (10 mL/Hr) IV Continuous <Continuous>  thiamine 100 milliGRAM(s) Oral daily  valsartan 80 milliGRAM(s) Oral once    MEDICATIONS  (PRN):      PAST MEDICAL & SURGICAL HISTORY:  HTN (hypertension)  No significant past surgical history    FAMILY HISTORY:  Family history of ischemic heart disease (IHD) (Mother)    Social History: No history of : Tobacco use, IVDA, EToH  ______________________________________________________________________________________    PHYSICAL EXAM    Daily     Daily Weight in k.5 (2018 05:36)  BMI:   Change in Weight:  Vital Signs Last 24 Hrs  T(C): 37 (2018 11:08), Max: 37 (2018 11:08)  T(F): 98.6 (2018 11:08), Max: 98.6 (2018 11:08)  HR: 84 (2018 11:08) (72 - 88)  BP: 158/88 (2018 11:08) (136/87 - 161/94)  BP(mean): --  RR: 18 (2018 11:08) (16 - 19)  SpO2: 100% (2018 11:08) (99% - 100%)    General:  Well developed, well nourished, alert and active, no pallor, NAD.  HEENT:    Normal appearance of conjunctiva, ears, nose, lips, oropharynx, and oral mucosa, anicteric.  Neck:  No masses, no asymmetry.  Lymph Nodes:  No lymphadenopathy.   Cardiovascular:  RRR normal S1/S2, no murmur.  Respiratory:  CTA B/L, normal respiratory effort.   Abdominal:   soft, no masses or tenderness, normoactive BS, NT/ND, no HSM.  ______________________________________________________  Lab Results:                          12.4   6.1   )-----------( 223      ( 2018 07:29 )             38.2     06-25    139  |  106  |  13  ----------------------------<  96  3.4<L>   |  27  |  0.88    Ca    8.8      2018 07:29  Phos  3.1     06-25  Mg     2.2     06-25    TPro  8.1  /  Alb  3.8  /  TBili  0.8  /  DBili  x   /  AST  20  /  ALT  31  /  AlkPhos  59  06-23    LIVER FUNCTIONS - ( 2018 11:50 )  Alb: x     / Pro: x     / ALK PHOS: x     / ALT: x     / AST: x     / GGT: 38 U/L       PT/INR - ( 2018 05:51 )   PT: 10.9 sec;   INR: 1.00 ratio         PTT - ( 2018 16:08 )  PTT:25.2 sec        Stool Results:          RADIOLOGY RESULTS:    SURGICAL PATHOLOGY:

## 2018-06-25 NOTE — DISCHARGE NOTE ADULT - CARE PROVIDER_API CALL
Pito Lopez), Gastroenterology; Internal Medicine  [address] [place] [address]  Phone: [phone]  Fax: [phone]

## 2018-06-25 NOTE — DISCHARGE NOTE ADULT - PLAN OF CARE
Continue with blood pressure medication. Maintain a healthy diet that consist of low sugar, low fat, low sodium diet. Exercise frequently if possible.  Follow up with primary care physician in one week after discharge. Follow up with GI specialist Dr Khan Admitted to the floor for Upper GI Bleed. Unknown source initially but EGD was performed: Findings significant for Grade A esophagitis, Small ulcer versus linear tear at GE junction with active oozing of blood. Two hemo-clip placed over the ulcer. MW tear versus GE junction ulcer (active oozing of blood) S/P Epinephrine and Hemo-clip placement. As per GI: Liquid diet today and advance to regular diet which patient tolerated. Will need follow up with Gastroenterologist upon discharge. Information provided upon discharge for Dr Khan. Continue with blood pressure medication dosage will be Diovan 320 mg PO daily Maintain a healthy diet that consist of low sugar, low fat, low sodium diet. Exercise frequently if possible.  Follow up with primary care physician in one week after discharge.

## 2018-06-26 VITALS
HEART RATE: 77 BPM | RESPIRATION RATE: 18 BRPM | DIASTOLIC BLOOD PRESSURE: 76 MMHG | OXYGEN SATURATION: 98 % | TEMPERATURE: 100 F | SYSTOLIC BLOOD PRESSURE: 138 MMHG

## 2018-06-26 LAB
ANION GAP SERPL CALC-SCNC: 10 MMOL/L — SIGNIFICANT CHANGE UP (ref 5–17)
BASOPHILS # BLD AUTO: 0.1 K/UL — SIGNIFICANT CHANGE UP (ref 0–0.2)
BASOPHILS NFR BLD AUTO: 0.7 % — SIGNIFICANT CHANGE UP (ref 0–2)
BUN SERPL-MCNC: 13 MG/DL — SIGNIFICANT CHANGE UP (ref 7–18)
CALCIUM SERPL-MCNC: 8.6 MG/DL — SIGNIFICANT CHANGE UP (ref 8.4–10.5)
CHLORIDE SERPL-SCNC: 106 MMOL/L — SIGNIFICANT CHANGE UP (ref 96–108)
CO2 SERPL-SCNC: 24 MMOL/L — SIGNIFICANT CHANGE UP (ref 22–31)
CREAT SERPL-MCNC: 0.82 MG/DL — SIGNIFICANT CHANGE UP (ref 0.5–1.3)
EOSINOPHIL # BLD AUTO: 0.1 K/UL — SIGNIFICANT CHANGE UP (ref 0–0.5)
EOSINOPHIL NFR BLD AUTO: 1.4 % — SIGNIFICANT CHANGE UP (ref 0–6)
GLUCOSE SERPL-MCNC: 94 MG/DL — SIGNIFICANT CHANGE UP (ref 70–99)
HCT VFR BLD CALC: 36.6 % — LOW (ref 39–50)
HGB BLD-MCNC: 11.9 G/DL — LOW (ref 13–17)
LYMPHOCYTES # BLD AUTO: 1.5 K/UL — SIGNIFICANT CHANGE UP (ref 1–3.3)
LYMPHOCYTES # BLD AUTO: 21.1 % — SIGNIFICANT CHANGE UP (ref 13–44)
MCHC RBC-ENTMCNC: 30.6 PG — SIGNIFICANT CHANGE UP (ref 27–34)
MCHC RBC-ENTMCNC: 32.5 GM/DL — SIGNIFICANT CHANGE UP (ref 32–36)
MCV RBC AUTO: 94.2 FL — SIGNIFICANT CHANGE UP (ref 80–100)
MONOCYTES # BLD AUTO: 0.9 K/UL — SIGNIFICANT CHANGE UP (ref 0–0.9)
MONOCYTES NFR BLD AUTO: 12.5 % — SIGNIFICANT CHANGE UP (ref 2–14)
NEUTROPHILS # BLD AUTO: 4.6 K/UL — SIGNIFICANT CHANGE UP (ref 1.8–7.4)
NEUTROPHILS NFR BLD AUTO: 64.2 % — SIGNIFICANT CHANGE UP (ref 43–77)
PLATELET # BLD AUTO: 207 K/UL — SIGNIFICANT CHANGE UP (ref 150–400)
POTASSIUM SERPL-MCNC: 3.3 MMOL/L — LOW (ref 3.5–5.3)
POTASSIUM SERPL-SCNC: 3.3 MMOL/L — LOW (ref 3.5–5.3)
RBC # BLD: 3.88 M/UL — LOW (ref 4.2–5.8)
RBC # FLD: 12.5 % — SIGNIFICANT CHANGE UP (ref 10.3–14.5)
SODIUM SERPL-SCNC: 140 MMOL/L — SIGNIFICANT CHANGE UP (ref 135–145)
WBC # BLD: 7.2 K/UL — SIGNIFICANT CHANGE UP (ref 3.8–10.5)
WBC # FLD AUTO: 7.2 K/UL — SIGNIFICANT CHANGE UP (ref 3.8–10.5)

## 2018-06-26 PROCEDURE — 81003 URINALYSIS AUTO W/O SCOPE: CPT

## 2018-06-26 PROCEDURE — 96375 TX/PRO/DX INJ NEW DRUG ADDON: CPT

## 2018-06-26 PROCEDURE — 82746 ASSAY OF FOLIC ACID SERUM: CPT

## 2018-06-26 PROCEDURE — 83036 HEMOGLOBIN GLYCOSYLATED A1C: CPT

## 2018-06-26 PROCEDURE — 82272 OCCULT BLD FECES 1-3 TESTS: CPT

## 2018-06-26 PROCEDURE — 99239 HOSP IP/OBS DSCHRG MGMT >30: CPT

## 2018-06-26 PROCEDURE — 84100 ASSAY OF PHOSPHORUS: CPT

## 2018-06-26 PROCEDURE — 96374 THER/PROPH/DIAG INJ IV PUSH: CPT | Mod: XU

## 2018-06-26 PROCEDURE — 83735 ASSAY OF MAGNESIUM: CPT

## 2018-06-26 PROCEDURE — 93005 ELECTROCARDIOGRAM TRACING: CPT

## 2018-06-26 PROCEDURE — 99285 EMERGENCY DEPT VISIT HI MDM: CPT | Mod: 25

## 2018-06-26 PROCEDURE — 84443 ASSAY THYROID STIM HORMONE: CPT

## 2018-06-26 PROCEDURE — 83690 ASSAY OF LIPASE: CPT

## 2018-06-26 PROCEDURE — 82977 ASSAY OF GGT: CPT

## 2018-06-26 PROCEDURE — 74176 CT ABD & PELVIS W/O CONTRAST: CPT

## 2018-06-26 PROCEDURE — 85730 THROMBOPLASTIN TIME PARTIAL: CPT

## 2018-06-26 PROCEDURE — 82607 VITAMIN B-12: CPT

## 2018-06-26 PROCEDURE — 80053 COMPREHEN METABOLIC PANEL: CPT

## 2018-06-26 PROCEDURE — 85027 COMPLETE CBC AUTOMATED: CPT

## 2018-06-26 PROCEDURE — 86850 RBC ANTIBODY SCREEN: CPT

## 2018-06-26 PROCEDURE — 86901 BLOOD TYPING SEROLOGIC RH(D): CPT

## 2018-06-26 PROCEDURE — 80061 LIPID PANEL: CPT

## 2018-06-26 PROCEDURE — 80048 BASIC METABOLIC PNL TOTAL CA: CPT

## 2018-06-26 PROCEDURE — 85610 PROTHROMBIN TIME: CPT

## 2018-06-26 PROCEDURE — C1889: CPT

## 2018-06-26 RX ORDER — POTASSIUM CHLORIDE 20 MEQ
40 PACKET (EA) ORAL EVERY 4 HOURS
Qty: 0 | Refills: 0 | Status: DISCONTINUED | OUTPATIENT
Start: 2018-06-26 | End: 2018-06-26

## 2018-06-26 RX ORDER — PANTOPRAZOLE SODIUM 20 MG/1
1 TABLET, DELAYED RELEASE ORAL
Qty: 30 | Refills: 0 | OUTPATIENT
Start: 2018-06-26 | End: 2018-07-25

## 2018-06-26 RX ORDER — THIAMINE MONONITRATE (VIT B1) 100 MG
1 TABLET ORAL
Qty: 30 | Refills: 0 | OUTPATIENT
Start: 2018-06-26 | End: 2018-07-25

## 2018-06-26 RX ORDER — VALSARTAN 80 MG/1
1 TABLET ORAL
Qty: 0 | Refills: 0 | COMMUNITY

## 2018-06-26 RX ORDER — PANTOPRAZOLE SODIUM 20 MG/1
40 TABLET, DELAYED RELEASE ORAL
Qty: 0 | Refills: 0 | Status: DISCONTINUED | OUTPATIENT
Start: 2018-06-26 | End: 2018-06-26

## 2018-06-26 RX ORDER — FOLIC ACID 0.8 MG
1 TABLET ORAL
Qty: 30 | Refills: 0 | OUTPATIENT
Start: 2018-06-26 | End: 2018-07-25

## 2018-06-26 RX ORDER — VALSARTAN 80 MG/1
1 TABLET ORAL
Qty: 30 | Refills: 0 | OUTPATIENT
Start: 2018-06-26 | End: 2018-07-25

## 2018-06-26 RX ADMIN — PANTOPRAZOLE SODIUM 10 MG/HR: 20 TABLET, DELAYED RELEASE ORAL at 06:19

## 2018-06-26 RX ADMIN — Medication 1 MILLIGRAM(S): at 12:06

## 2018-06-26 RX ADMIN — Medication 100 MILLIGRAM(S): at 12:06

## 2018-06-26 RX ADMIN — Medication 40 MILLIEQUIVALENT(S): at 12:08

## 2018-06-26 RX ADMIN — VALSARTAN 320 MILLIGRAM(S): 80 TABLET ORAL at 06:19

## 2018-06-26 NOTE — CHART NOTE - NSCHARTNOTEFT_GEN_A_CORE
Patient was seen and examined at bedside. Had no complains, no more episodes for melena or vomiting. S/p EGD , found to have a small bleeding ulcer. H/H is stable     ROS negative   PE:   General: comfortably laying in bed   Neck: supple  Cardio: regular rate and rhythm   Pulm: clear breath sounds   GI: abdomen is soft, non tender   Extr: No edema, no rash   Neuro: no focal weakness     Vital Signs Last 24 Hrs  T(C): 37.6 (26 Jun 2018 11:39), Max: 37.6 (26 Jun 2018 11:39)  T(F): 99.7 (26 Jun 2018 11:39), Max: 99.7 (26 Jun 2018 11:39)  HR: 77 (26 Jun 2018 11:39) (72 - 86)  BP: 138/76 (26 Jun 2018 11:39) (120/75 - 162/85)  BP(mean): --  RR: 18 (26 Jun 2018 11:39) (16 - 18)  SpO2: 98% (26 Jun 2018 11:39) (98% - 100%)    1. GI bleed secondary to bleeding ulcer: s/p EGD   To continue with Pantoprazole 40 mg po daily     2. HTN: BP better controlled Valsartan 320 mg po daily     Medically stable to discharge   Plan of discharge along with abstinence from alcohol and NSAID explained to patient.

## 2018-09-15 NOTE — DISCHARGE NOTE ADULT - REASON FOR ADMISSION
Patient Education     Sinusitis (Antibiotic Treatment)    The sinuses are air-filled spaces within the bones of the face. They connect to the inside of the nose. Sinusitis is an inflammation of the tissue lining the sinus cavity. Sinus inflammation can occur during a cold. It can also be due to allergies to pollens and other particles in the air. Sinusitis can cause symptoms of sinus congestion and fullness. A sinus infection causes fever, headache and facial pain. There is often green or yellow drainage from the nose or into the back of the throat (post-nasal drip). You have been given antibiotics to treat this condition.  Home care:  · Take the full course of antibiotics as instructed. Do not stop taking them, even if you feel better.  · Drink plenty of water, hot tea, and other liquids. This may help thin mucus. It also may promote sinus drainage.  · Heat may help soothe painful areas of the face. Use a towel soaked in hot water. Or,  the shower and direct the hot spray onto your face. Using a vaporizer along with a menthol rub at night may also help.   · An expectorant containing guaifenesin may help thin the mucus and promote drainage from the sinuses.  · Over-the-counter decongestants may be used unless a similar medicine was prescribed. Nasal sprays work the fastest. Use one that contains phenylephrine or oxymetazoline. First blow the nose gently. Then use the spray. Do not use these medicines more often than directed on the label or symptoms may get worse. You may also use tablets containing pseudoephedrine. Avoid products that combine ingredients, because side effects may be increased. Read labels. You can also ask the pharmacist for help. (NOTE: Persons with high blood pressure should not use decongestants. They can raise blood pressure.)  · Over-the-counter antihistamines may help if allergies contributed to your sinusitis.    · Do not use nasal rinses or irrigation during an acute sinus  infection, unless told to by your health care provider. Rinsing may spread the infection to other sinuses.  · Use acetaminophen or ibuprofen to control pain, unless another pain medicine was prescribed. (If you have chronic liver or kidney disease or ever had a stomach ulcer, talk with your doctor before using these medicines. Aspirin should never be used in anyone under 18 years of age who is ill with a fever. It may cause severe liver damage.)  · Don't smoke. This can worsen symptoms.  Follow-up care  Follow up with your healthcare provider or our staff if you are not improving within the next week.  When to seek medical advice  Call your healthcare provider if any of these occur:  · Facial pain or headache becoming more severe  · Stiff neck  · Unusual drowsiness or confusion  · Swelling of the forehead or eyelids  · Vision problems, including blurred or double vision  · Fever of 100.4ºF (38ºC) or higher, or as directed by your healthcare provider  · Seizure  · Breathing problems  · Symptoms not resolving within 10 days  © 1522-7777 [employer]. [address] [place] [address], [place] [address]. All rights reserved. This information is not intended as a substitute for professional medical care. Always follow your healthcare professional's instructions.            hematemesis

## 2023-08-29 NOTE — ED ADULT NURSE NOTE - PT NEEDS ASSIST
Will remain on current dose of Sinemet   Encouraged to remain active   Will call with there are any changes or worsening of tremors and could consider increasing the dose further no

## 2023-11-29 NOTE — ED PROVIDER NOTE - DISCUSSED CLINICAL AND RADIOLOGICAL FINDINGS WITH, MDM
General Surgery Week 3 Survey      Flowsheet Row Responses   [facility] facility patient discharged from? [facility]   Does the patient have one of the following disease processes/diagnoses(primary or secondary)? General Surgery   Week 3 attempt successful? No   Unsuccessful attempts Attempt 1   Revoke Readmitted            JANIE IQBAL - Registered Nurse   patient

## 2024-09-11 NOTE — CHART NOTE - NSCHARTNOTEFT_GEN_A_CORE
Esophagogastroduodenoscopy Report  Indication: Hematemesis  Referring MD: Dr. Aragon   Instrument:  #[id number]  Anesthesia: MAC  Consent:  Informed consent was obtained from the patient after providing any opportunity for questions  Procedure: The gastroscope was gently passed through the incisoral orifice into the oral cavity and under direct visualization the esophagus was intubated. The endoscope was passed down the esophagus, through the stomach and into the 3rd portion. Color, texture, mucosa and anatomy of the esophagus, stomach, and duodenum were carefully examined with the scope. The patient tolerated the procedure well. After completion of the examination, the patient was transferred to the recovery room.   Preparation: NPO   Findings:   Oropharynx	Normal  Esophagus	Normal   EG-junction	LA Grade A esophagitis   Small ulcer versus linear tear at GE junction with active oozing of blood. Two hemo-clip placed over the ulcer. The area was treated with a total of 4 ml of EPI (1:10,000)  Cardia	Normal   Body	Normal  Antrum	Mild erythema and edema. Random biopsies taken to rule out H. pylori [1]  Pylorus	Normal   Duodenal Bulb	Normal   2nd portion	Normal  3rd portion	Normal  Date and time:6/25/2018 2:06:05 PM  EBL:0  Impression: 1- MW tear versus GE junction ulcer (active oozing of blood) S.p EPI and Hemo-clip placement    Plan: 1- Liquid diet today 2- Advance to regular diet tomorrow 3- PPI daily 4- Avoid NSAIDs    Procedure Start Time: 13:53    Procedure End Time:   14:00    Attending:       Pito Lopez M.D.  Date and Time: 6/25/2018 2:06:05 PM English

## 2025-04-28 NOTE — ED ADULT NURSE NOTE - NS ED NURSE LEVEL OF CONSCIOUSNESS MENTAL STATUS
The plan associated with this patient's registration is an out of network HMO, and the patient is assigned with a primary care provider outside of [facility]. The patient should receive care with the provider or health system assigned by their insurance. If the patient chooses to receive care at [facility], their insurance may not pay for services incurred and the patient will be responsible for any balance due.    Exception: The patient can be seen and in-network benefits will be used if a specific referral/authorization or single case agreement has been obtained and is on file with the patient’s insurance for the service which the patient is scheduled/presenting. Check whether this payer/plan is included in the PIE tool, which may contain further details or exceptions.    Action:  - Inform the patient that they are out of network and likely have no out of network benefits. The patient should contact their insurance company to identify which clinicians or health systems are in network.  - If the patient has any Medicaid coverage secondary, they must receive care within their primary coverage network and may not be scheduled for non-emergent or elective services.  - If the patient chooses to receive care at [facility], the Financial Responsibility Agreement must be signed, and the patient's financial responsibility must be collected prior to service.  - Refer to the amount due in the prepayment section, or the Deposit Matrix if an amount hasn't been identified in the prepayment section.    Refer to the following [employer] Process documentation for additional assistance. Check whether this payer/plan is included in the PIE tool, which may contain further details or exceptions.  Payment Collection Scripting  Out of Network (OON) Alert Guide  PIE Tool     OK       Awake/Alert